# Patient Record
Sex: FEMALE | Race: WHITE | NOT HISPANIC OR LATINO | URBAN - METROPOLITAN AREA
[De-identification: names, ages, dates, MRNs, and addresses within clinical notes are randomized per-mention and may not be internally consistent; named-entity substitution may affect disease eponyms.]

---

## 2017-01-25 NOTE — PATIENT DISCUSSION
New Prescription: erythromycin (erythromycin): ointment: 5 mg/gram (0.5 %) 1 a small amount at bedtime as directed into both eyes 01-

## 2017-01-25 NOTE — PATIENT DISCUSSION
Ocular Rosacea Counseling:  I have explained the diagnosis of Ocular Rosacea and its pathophysiology. I have explained to the patient that if left untreated, ocular rosacea can cause corneal damage from tear film insufficiency or eyelid damage from inflammation, both of which could lead to vision loss. Encouraged patient to avoid exacerbating environmental factors. I instructed the patient on using warm compresses and eyelid scrubs. I also instructed the patient on using artificial tears two to four times per day. Successful management is dependent on patient compliance with the treatment regimen. Return for follow-up as scheduled or sooner if symptoms worsen.

## 2017-01-25 NOTE — PATIENT DISCUSSION
OCULAR ROSACEA, OU:  PRESCRIBE WARM COMPRESSES AND EYELID SCRUBS QD-BID, ARTIFICIAL TEARS BID-QID AND THE DAILY INTAKE OF OMEGA-3 FATTY ACIDS. CONSIDER TOPICAL STEROID, ORAL TETRACYCLINE AND/OR LIPIFLOW FOR EXACERBATIONS. RETURN FOR FOLLOW-UP AS SCHEDULED. START EMYCIN RENNY AT BEDTIME IN BOTH EYES FOR TWO WEEKS.

## 2017-02-14 NOTE — PATIENT DISCUSSION
OCULAR ROSACEA, OU: PRESCRIBE WARM COMPRESSES AND EYELID SCRUBS QD-BID, ARTIFICIAL TEARS BID-QID AND THE DAILY INTAKE OF OMEGA-3 FATTY ACIDS. CONSIDER TOPICAL STEROID, ORAL TETRACYCLINE AND/OR LIPIFLOW FOR EXACERBATIONS. RETURN FOR FOLLOW-UP AS SCHEDULED.  CONT EMYCIN RENNY AT BEDTIME IN BOTH EYES PRN

## 2017-02-14 NOTE — PATIENT DISCUSSION
Continue: erythromycin (erythromycin): ointment: 5 mg/gram (0.5 %) 1 a small amount at bedtime as directed into both eyes 01-

## 2022-07-26 ENCOUNTER — CONSULTATION/EVALUATION (OUTPATIENT)
Facility: LOCATION | Age: 72
End: 2022-07-26

## 2022-07-26 DIAGNOSIS — H52.4: ICD-10-CM

## 2022-07-26 DIAGNOSIS — Z96.1: ICD-10-CM

## 2022-07-26 DIAGNOSIS — H26.493: ICD-10-CM

## 2022-07-26 PROCEDURE — 66821 AFTER CATARACT LASER SURGERY: CPT

## 2022-07-26 PROCEDURE — 92015 DETERMINE REFRACTIVE STATE: CPT

## 2022-07-26 PROCEDURE — 99204 OFFICE O/P NEW MOD 45 MIN: CPT

## 2022-07-26 ASSESSMENT — VISUAL ACUITY
OS_CC: J7
OD_PH: 20/40-1
OD_CC: J7
OD_CC: 20/60
OS_CC: 20/60
OS_AM: 20/40
OS_PH: 20/50
OD_BAT: >20/400

## 2022-07-26 ASSESSMENT — TONOMETRY
OD_IOP_MMHG: 16
OS_IOP_MMHG: 12

## 2022-11-02 NOTE — PATIENT DISCUSSION
4X A DAY FOR 2 WEEKS THEN 2X A DAY FOR 2 WEEKS
COUNSELING:
Continue: Artificial Tears (polyvin alc) (polyvinyl alcohol): drops: 1.4%
Dry Eye Syndrome Counseling: I have discussed the diagnosis and the pathophysiology of this disease with the patient. Eyelid pathology and systemic illnesses such as Sjogren?s disease or rheumatoid arthritis may contribute to severity. Vision may be limited by dry eye, and symptoms exacerbated by environmental factors such as smoke, wind, or prolonged eye use. Lifestyle habits and environmental factors contributing to dry eyes have been reviewed with the patient. Daily prescribed and over the counter medications, along with their potential contributions to dry eye symptoms, have been discussed. Treatment options include, but are not limited to, artificial tears, punctal plugs, topical cyclosporine, oral omega-3 supplements, Lipiflow, moisture goggles, and lubricating ointments. I stressed the importance of compliance with treatment.
General:
MODERATE DRY EYES: PRESCRIBED PRESERVATIVE FREE ARTIFICIAL TEARS 4-6X A DAY, OU AND THE DAILY INTAKE OF OMEGA-3 DHA/EPA FATTY ACIDS TO HELP RELIEVE SYMPTOMS. ADD NIGHTLY LUBRICATING OINTMENT OR GEL. WILL CONSIDER RESTASIS OR PUNCTAL PLUGS AND/OR LIPIFLOW TREATMENT NEXT VISIT IF NOT RESPONSIVE OR IF SYMPTOMS PERSIST. RETURN FOR FOLLOW-UP AS SCHEDULED OR SOONER IF SYMPTOMS WORSEN.
Medications:
New Prescription: Lotemax (loteprednol etabonate): gel: 0.5% 1 drop four times a day into both eyes 10-
New Prescription: erythromycin (erythromycin): ointment: 5 mg/gram (0.5 %) a small amount at bedtime as directed into both eyes 10-
OCULAR ROSACEA, OU: PRESCRIBE  EMYCN RENNY QHS AND LOTEMAX BRIDGDE AND WARM COMPRESSES AND EYELID SCRUBS QD-BID, ARTIFICIAL TEARS BID-QID AND THE DAILY INTAKE OF OMEGA-3 FATTY ACIDS. CONSIDER TOPICAL STEROID, ORAL TETRACYCLINE AND/OR LIPIFLOW FOR EXACERBATIONS. RETURN FOR FOLLOW-UP AS SCHEDULED.
warm and dry/color normal/normal/no rashes/no ulcers